# Patient Record
Sex: MALE | Race: WHITE | NOT HISPANIC OR LATINO | URBAN - METROPOLITAN AREA
[De-identification: names, ages, dates, MRNs, and addresses within clinical notes are randomized per-mention and may not be internally consistent; named-entity substitution may affect disease eponyms.]

---

## 2025-02-07 ENCOUNTER — OFFICE VISIT (OUTPATIENT)
Dept: URGENT CARE | Age: 45
End: 2025-02-07

## 2025-02-07 VITALS
BODY MASS INDEX: 25.18 KG/M2 | SYSTOLIC BLOOD PRESSURE: 145 MMHG | HEART RATE: 98 BPM | WEIGHT: 190 LBS | OXYGEN SATURATION: 98 % | TEMPERATURE: 99.2 F | DIASTOLIC BLOOD PRESSURE: 94 MMHG | HEIGHT: 73 IN

## 2025-02-07 DIAGNOSIS — H66.002 NON-RECURRENT ACUTE SUPPURATIVE OTITIS MEDIA OF LEFT EAR WITHOUT SPONTANEOUS RUPTURE OF TYMPANIC MEMBRANE: ICD-10-CM

## 2025-02-07 DIAGNOSIS — H60.502 ACUTE OTITIS EXTERNA OF LEFT EAR, UNSPECIFIED TYPE: Primary | ICD-10-CM

## 2025-02-07 RX ORDER — CIPROFLOXACIN 0.5 MG/.25ML
0.25 SOLUTION/ DROPS AURICULAR (OTIC) 2 TIMES DAILY
Qty: 14 EACH | Refills: 0 | Status: SHIPPED | OUTPATIENT
Start: 2025-02-07 | End: 2025-02-07

## 2025-02-07 RX ORDER — AMOXICILLIN 500 MG/1
500 CAPSULE ORAL EVERY 12 HOURS SCHEDULED
Qty: 14 CAPSULE | Refills: 0 | Status: SHIPPED | OUTPATIENT
Start: 2025-02-07 | End: 2025-02-07

## 2025-02-07 RX ORDER — OFLOXACIN 3 MG/ML
5 SOLUTION AURICULAR (OTIC) DAILY
Qty: 5 ML | Refills: 0 | Status: SHIPPED | OUTPATIENT
Start: 2025-02-07 | End: 2025-02-17

## 2025-02-07 RX ORDER — AMOXICILLIN 500 MG/1
500 CAPSULE ORAL EVERY 12 HOURS SCHEDULED
Qty: 14 CAPSULE | Refills: 0 | Status: SHIPPED | OUTPATIENT
Start: 2025-02-07 | End: 2025-02-14

## 2025-02-07 RX ORDER — CIPROFLOXACIN 0.5 MG/.25ML
0.25 SOLUTION/ DROPS AURICULAR (OTIC) 2 TIMES DAILY
Qty: 14 EACH | Refills: 0 | Status: SHIPPED | OUTPATIENT
Start: 2025-02-07 | End: 2025-02-14

## 2025-02-08 NOTE — PROGRESS NOTES
"Subjective   Patient ID: Emile Barajas is a 44 y.o. male. They present today with a chief complaint of Earache (Ear blockage for 1 week that is painful /Left ear. Loss of hearing).  Patient reports more difficulty hearing and pain on left side of his face.  Denies previous history of ear infections.  Denies fever, chills, chest pain, shortness of breath.    Past Medical History  Allergies as of 02/07/2025    (No Known Allergies)       (Not in a hospital admission)       No past medical history on file.    No past surgical history on file.     reports that he has never smoked. He has never used smokeless tobacco.    Review of Systems  Review of Systems   HENT:          Left ear pain and fullness                                  Objective    Vitals:    02/07/25 1911   BP: (!) 145/94   Pulse: 98   Temp: 37.3 °C (99.2 °F)   TempSrc: Temporal   SpO2: 98%   Weight: 86.2 kg (190 lb)   Height: 1.854 m (6' 1\")     No LMP for male patient.    Physical Exam  Vitals and nursing note reviewed.   Constitutional:       Appearance: Normal appearance.   HENT:      Head:      Comments: Left otitis externa present.  Erythematous bulging left TM.  Normal right TM and external auditory canal.  No perforation.  Eyes:      Conjunctiva/sclera: Conjunctivae normal.   Cardiovascular:      Rate and Rhythm: Normal rate.   Pulmonary:      Effort: Pulmonary effort is normal.   Neurological:      Mental Status: He is alert.         Procedures    Point of Care Test & Imaging Results from this visit  No results found for this visit on 02/07/25.   No results found.    Diagnostic study results (if any) were reviewed by Bipin Moran PA-C.    Assessment/Plan   Allergies, medications, history, and pertinent labs/EKGs/Imaging reviewed by Bipin Moran PA-C.     Medical Decision Making  Findings consistent with AOM without rupture. No evidence of mastoiditis, PTA, PNA on exam. Education on supportive measures and return precautions.   Amoxicillin.  " Additionally otitis externa we will treat with ciprofloxacin drops.  Education supportive measures.    Orders and Diagnoses  Diagnoses and all orders for this visit:  Acute otitis externa of left ear, unspecified type  -     amoxicillin (Amoxil) 500 mg capsule; Take 1 capsule (500 mg) by mouth every 12 hours for 7 days.  -     ciprofloxacin (Cetraxal) 0.2 % otic solution; Administer 0.25 mL into the left ear 2 times a day for 7 days.  Non-recurrent acute suppurative otitis media of left ear without spontaneous rupture of tympanic membrane  -     amoxicillin (Amoxil) 500 mg capsule; Take 1 capsule (500 mg) by mouth every 12 hours for 7 days.  -     ciprofloxacin (Cetraxal) 0.2 % otic solution; Administer 0.25 mL into the left ear 2 times a day for 7 days.      Medical Admin Record      Patient disposition: Home    Electronically signed by Bipin Moran PA-C  7:27 PM